# Patient Record
Sex: MALE | Race: WHITE | NOT HISPANIC OR LATINO | URBAN - METROPOLITAN AREA
[De-identification: names, ages, dates, MRNs, and addresses within clinical notes are randomized per-mention and may not be internally consistent; named-entity substitution may affect disease eponyms.]

---

## 2019-03-04 ENCOUNTER — HOSPITAL ENCOUNTER (EMERGENCY)
Facility: OTHER | Age: 34
Discharge: HOME | End: 2019-03-05
Attending: EMERGENCY MEDICINE

## 2019-03-04 DIAGNOSIS — F10.920 ALCOHOLIC INTOXICATION WITHOUT COMPLICATION: Primary | ICD-10-CM

## 2019-03-04 PROCEDURE — 99281 EMR DPT VST MAYX REQ PHY/QHP: CPT

## 2019-03-05 VITALS
RESPIRATION RATE: 16 BRPM | OXYGEN SATURATION: 97 % | SYSTOLIC BLOOD PRESSURE: 120 MMHG | WEIGHT: 200 LBS | BODY MASS INDEX: 28.63 KG/M2 | TEMPERATURE: 98 F | DIASTOLIC BLOOD PRESSURE: 73 MMHG | HEART RATE: 92 BPM | HEIGHT: 70 IN

## 2019-03-05 NOTE — ED NOTES
Pt found by EMS very intoxicated trying to use his key to get into somebody's house. Pt is down in [place] from [facility] for [facility]. Pt has slurred speech and nystagmus. Pt is A & O x 3. Pt denies SOB, chest pain, fever, chills and N/V/D. Pt states he has had a lot to drink. Skin is warm, dry and pink. VS. BRUNO x 3mm. BBS- CTA. Abd- SNT. PSM x 4 exts.  Pt is connected to the pulse ox, B/P cuff and EKG monitor. Bed is locked and in the low position w/ the side rails up and locked for pt safety. Call bell @ the BS. Will continue to monitor closely.

## 2019-03-05 NOTE — ED NOTES
Pt L lateral recumbent, resting w/ eyes closed and in no respiratory distress. Will contiue to monitor closely.

## 2019-03-05 NOTE — ED NOTES
Pt lying R lateral recumbent, resting w/ eyes closed and in no obvious respiratory distress. Pt arouses easily, then goes back to sleep. Respirations are even and unlabored. Will continue to  Monitor closely.

## 2019-03-05 NOTE — ED NOTES
Pt resting w/ eyes closed and in no respiratory distress.  Respirations are even and unlabored. VSS. Will continue to monitor closely.

## 2019-03-05 NOTE — ED PROVIDER NOTES
Encounter Date: 3/4/2019    SCRIBE #1 NOTE: I, Farrah Price, am scribing for, and in the presence of, Dr. Peña.       History     Chief Complaint   Patient presents with    Alcohol Intoxication     patient found wandering into someone's yard. +ETOH     Time seen by provider: 11:03 PM    This is a 34 y.o. male who presents via EMS with alcohol intoxication. Per EMS, patient was drinking in the [place]. He does not know why he is here. He denies trauma or pain. He has no complaints at this time.      The history is provided by the patient and the EMS personnel.     Review of patient's allergies indicates:  No Known Allergies  No past medical history on file.  No past surgical history on file.  No family history on file.  Social History     Tobacco Use    Smoking status: Not on file   Substance Use Topics    Alcohol use: Not on file    Drug use: Not on file     Review of Systems   Constitutional: Negative for fever.        Positive for alcohol intoxication.   HENT: Negative for sore throat.    Respiratory: Negative for shortness of breath.    Cardiovascular: Negative for chest pain.   Gastrointestinal: Negative for abdominal pain and nausea.   Genitourinary: Negative for dysuria.   Musculoskeletal: Negative for back pain.   Skin: Negative for rash.   Neurological: Negative for weakness.   Hematological: Does not bruise/bleed easily.       Physical Exam     Initial Vitals   BP Pulse Resp Temp SpO2   03/04/19 2254 03/04/19 2254 03/04/19 2254 03/04/19 2259 03/04/19 2254   (!) 137/92 (!) 113 18 98.2 °F (36.8 °C) 95 %      MAP       --                Physical Exam    Nursing note and vitals reviewed.  Constitutional: He appears well-developed and well-nourished. He is not diaphoretic. No distress.   HENT:   Head: Normocephalic and atraumatic.   Eyes: Conjunctivae and EOM are normal. Pupils are equal, round, and reactive to light. No scleral icterus.   Neck: Normal range of motion. Neck supple.   Cardiovascular:  Normal rate, regular rhythm and normal heart sounds. Exam reveals no gallop and no friction rub.    No murmur heard.  Pulmonary/Chest: Breath sounds normal. No respiratory distress. He has no wheezes. He has no rhonchi. He has no rales.   Abdominal: Soft. Bowel sounds are normal. He exhibits no distension. There is no tenderness. There is no rebound and no guarding.   Musculoskeletal: Normal range of motion. He exhibits no edema or tenderness.   Neurological: He is alert and oriented to person, place, and time. GCS score is 15. GCS eye subscore is 4. GCS verbal subscore is 5. GCS motor subscore is 6.   Skin: Skin is warm and dry.         ED Course   Procedures  Labs Reviewed - No data to display       Imaging Results    None             Additional MDM:   Comments: 34-year-old male brought in by EMS secondary to alcohol intoxication.  Patient denies any complaints. He will be observed on a cardiac monitor until clinically sober.  Blood glucose within normal limits.    6:35 AM  Patient at this time is clinically sober and appropriate for discharge..          Scribe Attestation:   Scribe #1: I performed the above scribed service and the documentation accurately describes the services I performed. I attest to the accuracy of the note.    Attending Attestation:           Physician Attestation for Scribe:  Physician Attestation Statement for Scribe #1: I, Dr. Peña, reviewed documentation, as scribed by Farrah Price in my presence, and it is both accurate and complete.                    Clinical Impression:     1. Alcoholic intoxication without complication                               Mercedes Peañ MD  03/05/19 [id number]